# Patient Record
Sex: FEMALE | Race: WHITE | NOT HISPANIC OR LATINO | Employment: FULL TIME | URBAN - METROPOLITAN AREA
[De-identification: names, ages, dates, MRNs, and addresses within clinical notes are randomized per-mention and may not be internally consistent; named-entity substitution may affect disease eponyms.]

---

## 2019-08-29 ENCOUNTER — OFFICE VISIT (OUTPATIENT)
Dept: NEUROSURGERY | Facility: CLINIC | Age: 39
End: 2019-08-29

## 2019-08-29 VITALS
TEMPERATURE: 98.7 F | DIASTOLIC BLOOD PRESSURE: 70 MMHG | BODY MASS INDEX: 27 KG/M2 | WEIGHT: 172 LBS | HEIGHT: 67 IN | SYSTOLIC BLOOD PRESSURE: 114 MMHG

## 2019-08-29 DIAGNOSIS — M51.36 DEGENERATIVE DISC DISEASE, LUMBAR: Primary | ICD-10-CM

## 2019-08-29 DIAGNOSIS — M54.50 LEFT-SIDED LOW BACK PAIN WITHOUT SCIATICA, UNSPECIFIED CHRONICITY: ICD-10-CM

## 2019-08-29 DIAGNOSIS — M53.3 SACRO-ILIAC PAIN: ICD-10-CM

## 2019-08-29 DIAGNOSIS — M25.552 LEFT HIP PAIN: ICD-10-CM

## 2019-08-29 PROCEDURE — 99203 OFFICE O/P NEW LOW 30 MIN: CPT | Performed by: NEUROLOGICAL SURGERY

## 2019-08-29 RX ORDER — MONTELUKAST SODIUM 10 MG/1
TABLET ORAL
COMMUNITY
Start: 2019-06-24

## 2019-08-29 RX ORDER — VENLAFAXINE HYDROCHLORIDE 75 MG/1
CAPSULE, EXTENDED RELEASE ORAL
COMMUNITY
Start: 2019-06-24

## 2019-08-29 RX ORDER — TOPIRAMATE 50 MG/1
50 TABLET, FILM COATED ORAL 2 TIMES DAILY
COMMUNITY

## 2019-08-29 RX ORDER — GABAPENTIN 300 MG/1
CAPSULE ORAL
Refills: 0 | COMMUNITY
Start: 2019-08-12

## 2019-08-29 RX ORDER — ACYCLOVIR 400 MG/1
400 TABLET ORAL 2 TIMES WEEKLY
COMMUNITY

## 2019-08-29 NOTE — PROGRESS NOTES
Kimberly Julian  1980  [id number]      Chief Complaint   Patient presents with   • Back Pain   • Leg Pain       HISTORY OF PRESENT ILLNESS: This is a 38-year-old female who is seen with a several month history of pain in her left hip rating to her left groin.  This occurred while she was lifting weights.  She initially treated with exercises and dry needling and markedly better.  She then returned to her regular activities with exacerbation of her symptoms which have persisted.  In the course of her work-up she had a lumbar MRI and is referred for neurosurgical consultation.  The pain is primarily in her left sacroiliac area left hip rating into her groin.  She does not have radicular symptoms.  Next    She has a family history of degenerative osteoarthritis and disc degeneration.    Past Medical History:   Diagnosis Date   • Arthritis    • Asthma    • Headache        History reviewed. No pertinent surgical history.    Family History   Problem Relation Age of Onset   • No Known Problems Mother    • No Known Problems Father        Social History     Socioeconomic History   • Marital status:      Spouse name: Not on file   • Number of children: Not on file   • Years of education: Not on file   • Highest education level: Not on file   Tobacco Use   • Smoking status: Former Smoker   • Smokeless tobacco: Never Used   Substance and Sexual Activity   • Drug use: No   • Sexual activity: Defer       Allergies   Allergen Reactions   • Sulfa Antibiotics Hives         Current Outpatient Medications:   •  acyclovir (ZOVIRAX) 400 MG tablet, Take 400 mg by mouth 2 (Two) Times a Week. Take no more than 5 doses a day., Disp: , Rfl:   •  gabapentin (NEURONTIN) 300 MG capsule, take 1 capsule by mouth at bedtime for 1 day then take 1 capsule ...  (REFER TO PRESCRIPTION NOTES)., Disp: , Rfl: 0  •  montelukast (SINGULAIR) 10 MG tablet, , Disp: , Rfl:   •  topiramate (TOPAMAX) 50 MG tablet, Take 50 mg by mouth 2 (Two) Times a  Day., Disp: , Rfl:   •  venlafaxine XR (EFFEXOR-XR) 75 MG 24 hr capsule, , Disp: , Rfl:     Review of Systems   Constitutional: Negative for activity change, appetite change, chills, diaphoresis, fatigue, fever and unexpected weight change.   HENT: Negative for congestion, dental problem, drooling, ear discharge, ear pain, facial swelling, hearing loss, mouth sores, nosebleeds, postnasal drip, rhinorrhea, sinus pressure, sneezing, sore throat, tinnitus, trouble swallowing and voice change.    Eyes: Negative for photophobia, pain, discharge, redness, itching and visual disturbance.   Respiratory: Negative for apnea, cough, choking, chest tightness, shortness of breath, wheezing and stridor.    Cardiovascular: Negative for chest pain, palpitations and leg swelling.   Gastrointestinal: Negative for abdominal distention, abdominal pain, anal bleeding, blood in stool, constipation, diarrhea, nausea, rectal pain and vomiting.   Endocrine: Negative for cold intolerance, heat intolerance, polydipsia, polyphagia and polyuria.   Genitourinary: Negative for decreased urine volume, difficulty urinating, dysuria, enuresis, flank pain, frequency, genital sores, hematuria and urgency.   Musculoskeletal: Positive for arthralgias, back pain, myalgias and neck stiffness. Negative for gait problem, joint swelling and neck pain.   Skin: Negative for color change, pallor, rash and wound.   Allergic/Immunologic: Negative for environmental allergies, food allergies and immunocompromised state.   Neurological: Negative for dizziness, tremors, seizures, syncope, facial asymmetry, speech difficulty, weakness, light-headedness, numbness and headaches.   Hematological: Negative for adenopathy. Does not bruise/bleed easily.   Psychiatric/Behavioral: Negative for agitation, behavioral problems, confusion, decreased concentration, dysphoric mood, hallucinations, self-injury, sleep disturbance and suicidal ideas. The patient is not nervous/anxious  "and is not hyperactive.        Vitals:    08/29/19 1334   BP: 114/70   BP Location: Right arm   Patient Position: Sitting   Temp: 98.7 °F (37.1 °C)   TempSrc: Temporal   Weight: 78 kg (172 lb)   Height: 170.2 cm (67\")       Neurological Examination:    Mental status/speech: The patient is alert and oriented.  Speech is clear without aphysia or dysarthria.  No overt cognitive deficits.    Cranial nerve examination:    Olfaction: Smell is intact.  Vision: Vision is intact without visual field abnormalities.  Funduscopic examination is normal.  No pupillary irregularity.  Ocular motor examination: The extraocular muscles are intact.  There is no diplopia.  The pupil is round and reactive to both light and accommodation.  There is no nystagmus.  Facial movement/sensation: There is no facial weakness.  Sensation is intact in the first, second, and third divisions of the trigeminal nerve.  The corneal reflex is intact.  Auditory: Hearing is intact to finger rub bilaterally.  Cranial nerves IX, X, XI, XII: Phonation is normal.  No dysphagia.  Tongue is protruded in the midline without atrophy.  The gag reflex is intact.  Shoulder shrug is normal.    Musculoligamentous ligamentous examination: Straight leg raising, Lasègue and flip test are negative.  I find no evidence of weakness, sensory loss or reflex asymmetry.  This includes hip flexion and quadriceps function.  The reflexes are symmetrically preserved.  No Babinski Femi or clonus.    Medical Decision Making:     Diagnostic Data Set: The lumbar MRI data set shows disc degeneration there is central disc protrusion which is quite small L1-2.  It does not compromise the neuroforamen on either side.  She does have disc degeneration also at T11-T12.      Assessment: Ligamentous injury left pelvis, sacroiliac joint          Recommendations: She does not have an abnormality that would necessitate surgical intervention; she has no evidence of a disc herniation.  I believe " the symptoms that she has are traumatic, ligamentous centered into the pelvis, sacroiliac joint or hip.  I would recommend referral to sports medicine for their evaluation and further recommendation.  It seems as if she was doing quite well and then returned to her exercise prematurely.  She is to call me subsequently.  Thank you for allowing me to see her.  I have made the appropriate referral.        I greatly appreciate the opportunity to see and evaluate this individual.  If you have questions or concerns regarding issues that I may have overlooked please call me at any time: [phone].  Steven Ortiz M.D.  [facility]  [address] [place] [address]    Scribed for Eduin Ortiz MD by Shalonda Hartley CMA. 8/29/2019  1:57 PM     I have read and concur with the information provided by the scribe.  Eduin Ortiz MD

## 2019-08-29 NOTE — PATIENT INSTRUCTIONS
Call Dr. Ortiz on a Monday or Tuesday with an update.      Ask for Estrella () and leave a message for  Dr. Ortiz.   He will call you back at the end of the day as soon as he can.     [phone]